# Patient Record
Sex: MALE | URBAN - METROPOLITAN AREA
[De-identification: names, ages, dates, MRNs, and addresses within clinical notes are randomized per-mention and may not be internally consistent; named-entity substitution may affect disease eponyms.]

---

## 2018-01-03 ENCOUNTER — OFFICE VISIT (OUTPATIENT)
Dept: INTERNAL MEDICINE | Facility: CLINIC | Age: 53
End: 2018-01-03
Payer: COMMERCIAL

## 2018-01-03 VITALS
HEART RATE: 63 BPM | HEIGHT: 72 IN | DIASTOLIC BLOOD PRESSURE: 66 MMHG | SYSTOLIC BLOOD PRESSURE: 110 MMHG | BODY MASS INDEX: 31.86 KG/M2 | WEIGHT: 235.2 LBS | OXYGEN SATURATION: 100 % | TEMPERATURE: 97.6 F

## 2018-01-03 DIAGNOSIS — M54.50 RIGHT-SIDED LOW BACK PAIN WITHOUT SCIATICA, UNSPECIFIED CHRONICITY: Primary | ICD-10-CM

## 2018-01-03 PROCEDURE — 99203 OFFICE O/P NEW LOW 30 MIN: CPT | Performed by: INTERNAL MEDICINE

## 2018-01-03 NOTE — PROGRESS NOTES
SUBJECTIVE:                                                      HPI: Imtiaz Cope is a pleasant 52 year old male who presents for referral to physical therapy:    - right lower back pain/superior buttock; no radiation down leg  - ongoing for weeks  - no obvious precipitating trauma, injury, unusual exertion    - pain when sitting, extending right leg in front of him, and tucking chin in  - also difficult to find comfortable position at night while sleeping    - no significant pain with activity or changing positions  - no significant pain with crossing right leg over left  - no significant pain with direct palpation of area involved    - no lower extremity numbness, tingling, or weakness  - no urinary retention or incontinence  - no fecal incontinence    PMH significant for testicular cancer status post orchiectomy and prophylactic radiation for left-sided seminoma in 2009. PRESTON since.    PMH also significant for obesity (BMI ~32).     Of note, patient exercises regularly without difficulty or pain/discomfort.    The medication, allergy, and problem lists have been reviewed and updated as appropriate.       OBJECTIVE:                                                      /66 (BP Location: Left arm, Patient Position: Chair, Cuff Size: Adult Large)  Pulse 63  Temp 97.6  F (36.4  C) (Oral)  Ht 6' (1.829 m)  Wt 235 lb 3.2 oz (106.7 kg)  SpO2 100%  BMI 31.9 kg/m2  Constitutional: well-appearing  Lumbar spine: no deformity, crepitus, or step-off; no spinal or paraspinal tenderness to palpation  Musculoskeletal: normal gait, station, and transfers; no piriformis or gluteal tenderness to palpation      ASSESSMENT/PLAN:                                                      (M54.5) Right-sided low back pain without sciatica, unspecified chronicity  (primary encounter diagnosis)  Comment: etiology unclear, likely musculoskeletal.  Plan:    - recommend trial of physical therapy.   - if no improvement with  physical therapy, patient to contact MD for further evaluation...    - especially in light of testicular cancer history.    The instructions on the AVS were discussed and explained to the patient. Patient expressed understanding of instructions.    (Chart documentation was completed, in part, with [employer] voice-recognition software. Even though reviewed, some grammatical, spelling, and word errors may remain.)    Staci Nelson MD   [address] [address]  T: [phone], F: [phone]

## 2018-01-03 NOTE — MR AVS SNAPSHOT
After Visit Summary   1/3/2018    Imtiaz Cope    MRN: [id number]           Patient Information     Date Of Birth          1965        Visit Information        Provider Department      1/3/2018 9:30 AM Staci Nelson MD [facility]        Today's Diagnoses     Right-sided low back pain without sciatica, unspecified chronicity    -  1      Care Instructions    Referral placed!          Follow-ups after your visit        Additional Services     [facility] PT, HAND, AND CHIROPRACTIC REFERRAL       **This order will print in the [facility] Scheduling Office**    Physical Therapy, Hand Therapy and Chiropractic Care are available through:    *[facility]  *[facility]  *[facility]    Call one number to schedule at any of the above locations: [phone].    Your provider has referred you to: Physical Therapy at [facility] or [facility]    Indication/Reason for Referral: Low Back Pain  Onset of Illness: weeks  Therapy Orders: Evaluate and Treat  Special Programs: None  Special Request: None    Tika Villela      Additional Comments for the Therapist or Chiropractor: already has appointment in [place] - 1/5/17 - Paul Breyen     Please be aware that coverage of these services is subject to the terms and limitations of your health insurance plan.  Call member services at your health plan with any benefit or coverage questions.      Please bring the following to your appointment:    *Your personal calendar for scheduling future appointments  *Comfortable clothing                  Who to contact     If you have questions or need follow up information about today's clinic visit or your schedule please contact [facility] directly at [phone].  Normal or non-critical lab and imaging results will be communicated to you by MyChart, letter or phone within 4 business days after the clinic has received the results. If you do  "not hear from us within 7 days, please contact the clinic through [employer] or phone. If you have a critical or abnormal lab result, we will notify you by phone as soon as possible.  Submit refill requests through [employer] or call your pharmacy and they will forward the refill request to us. Please allow 3 business days for your refill to be completed.          Additional Information About Your Visit        [employer] Information     [employer] lets you send messages to your doctor, view your test results, renew your prescriptions, schedule appointments and more. To sign up, go to [email or web address] . Click on \"Log in\" on the left side of the screen, which will take you to the Welcome page. Then click on \"Sign up Now\" on the right side of the page.     You will be asked to enter the access code listed below, as well as some personal information. Please follow the directions to create your username and password.     Your access code is: [email or web address]  Expires: 3/29/2018  3:38 PM     Your access code will  in 90 days. If you need help or a new code, please call your [facility] clinic or [phone].        Care EveryWhere ID     This is your Care EveryWhere ID. This could be used by other organizations to access your [facility] medical records  [id number]        Your Vitals Were     Pulse Temperature Height Pulse Oximetry BMI (Body Mass Index)       63 97.6  F (36.4  C) (Oral) 6' (1.829 m) 100% 31.9 kg/m2        Blood Pressure from Last 3 Encounters:   18 110/66    Weight from Last 3 Encounters:   18 235 lb 3.2 oz (106.7 kg)              We Performed the Following     [facility] PT, HAND, AND CHIROPRACTIC REFERRAL        Primary Care Provider Fax #    Physician No Ref-Primary [phone]       No address on file        Equal Access to Services     CRISTINA GRIER : Camacho Justice, waantonio bland, qaybta kaalmajose carpenter, andrez rubin. So [facility] [phone].    ATENCIÓN: " Si habla brandy, tiene a short disposición servicios gratuitos de asistencia lingüística. Jarod marc [phone].    We comply with applicable federal civil rights laws and [place] laws. We do not discriminate on the basis of race, color, national origin, age, disability, sex, sexual orientation, or gender identity.            Thank you!     Thank you for choosing [facility]  for your care. Our goal is always to provide you with excellent care. Hearing back from our patients is one way we can continue to improve our services. Please take a few minutes to complete the written survey that you may receive in the mail after your visit with us. Thank you!             Your Updated Medication List - Protect others around you: Learn how to safely use, store and throw away your medicines at www.disposemymeds.org.      Notice  As of 1/3/2018  9:47 AM    You have not been prescribed any medications.

## 2018-01-03 NOTE — PROGRESS NOTES
Low back pain  Right top buttock  Weeks  Better with stretching   No radiation down leg  Sharp pain  knife

## 2018-01-05 ENCOUNTER — THERAPY VISIT (OUTPATIENT)
Dept: PHYSICAL THERAPY | Facility: CLINIC | Age: 53
End: 2018-01-05
Payer: COMMERCIAL

## 2018-01-05 DIAGNOSIS — M54.41 ACUTE RIGHT-SIDED LOW BACK PAIN WITH RIGHT-SIDED SCIATICA: Primary | ICD-10-CM

## 2018-01-05 PROCEDURE — 97110 THERAPEUTIC EXERCISES: CPT | Mod: GP | Performed by: PHYSICAL THERAPIST

## 2018-01-05 PROCEDURE — 97161 PT EVAL LOW COMPLEX 20 MIN: CPT | Mod: GP | Performed by: PHYSICAL THERAPIST

## 2018-01-05 NOTE — PROGRESS NOTES
Subjective:  Patient is a 52 year old male presenting with rehab back hpi. The history is provided by the patient. No  was used.   Imtiaz Cope is a 52 year old male with a lumbar condition.  Condition occurred with:  Insidious onset.  Condition occurred: for unknown reasons.  This is a new condition  Onset of right lumbar pain radiating into the right buttock of unknown etiology. Pt referred by MD for physical therapy on 1-3-18.    Patient reports pain:  Lumbar spine right.  Radiates to:  Gluteals right, knee right and thigh right.  Pain is described as sharp and stabbing and is intermittent and reported as 8/10.  Associated symptoms:  Loss of strength. Pain is worse in the A.M. and worse in the P.M..  Symptoms are exacerbated by bending and sitting and relieved by NSAID's (stretch).  Since onset symptoms are unchanged.  Special testing: none.  Previous treatment includes physical therapy and surgery (lumbar discectomy greater than 6 years ago).    General health as reported by patient is good.  Pertinent medical history includes:  Cancer and heart problems.  Medical allergies: no.  Other surgeries include:  Orthopedic surgery and cancer surgery (lumbar discectomy, testicular cancer).  Current medications:  None as reported by the patient.  Current occupation .  Patient is working in normal job without restrictions.  Primary job tasks include:  Prolonged sitting.        Red flags:  None as reported by the patient.                        Objective:  Standing Alignment:        Lumbar deviations alignment: increased lordosis.                Flexibility/Screens:       Lower Extremity:  Decreased left lower extremity flexibility:Hamstrings    Decreased right lower extremity flexibility:  Piriformis and Hamstrings               Lumbar/SI Evaluation  ROM:  Arom wnl lumbar: positive slump test sitting.  AROM Lumbar:   Flexion:          75% pulls  Ext:                    80%   Side Bend:         Left:  90%    Right:  90%  Rotation:           Left:     Right:   Side Glide:        Left:     Right:         Strength: weak lower abdominals  Lumbar Myotomes:  normal            Lumbar DTR's:  normal        Lumbar Dermtomes:  Lumbar dermatomes: decreased sensation right S1 dermatome from previous lumbar surgery.                  Lumbar Palpation:  Palpation (lumbar): tender to palpation right piriformis.                                                         General     ROS    Assessment/Plan:    Patient is a 52 year old male with lumbar complaints.    Patient has the following significant findings with corresponding treatment plan.                Diagnosis 1:  Lumbar painPain -  hot/cold therapy, manual therapy, self management, education and home program  Decreased ROM/flexibility - manual therapy, therapeutic exercise, therapeutic activity and home program  Decreased strength - therapeutic exercise, therapeutic activities and home program    Therapy Evaluation Codes:   1) History comprised of:   Personal factors that impact the plan of care:      Past/current experiences.    Comorbidity factors that impact the plan of care are:      Cancer, Heart problems and Numbness/tingling.     Medications impacting care: None.  2) Examination of Body Systems comprised of:   Body structures and functions that impact the plan of care:      Lumbar spine.   Activity limitations that impact the plan of care are:      Bathing, Bending, Dressing, Sitting, Standing and Walking.  3) Clinical presentation characteristics are:   Stable/Uncomplicated.  4) Decision-Making    Low complexity using standardized patient assessment instrument and/or measureable assessment of functional outcome.  Cumulative Therapy Evaluation is: Low complexity.    Previous and current functional limitations:  (See Goal Flow Sheet for this information)    Short term and Long term goals: (See Goal Flow Sheet for this information)     Communication ability:   Patient appears to be able to clearly communicate and understand verbal and written communication and follow directions correctly.  Treatment Explanation - The following has been discussed with the patient:   RX ordered/plan of care  Anticipated outcomes  Possible risks and side effects  This patient would benefit from PT intervention to resume normal activities.   Rehab potential is good.    Frequency:  1 X week, once daily  Duration:  for 4 weeks  Discharge Plan:  Achieve all LTG.  Independent in home treatment program.  Reach maximal therapeutic benefit.    Please refer to the daily flowsheet for treatment today, total treatment time and time spent performing 1:1 timed codes.

## 2018-01-06 PROBLEM — M54.41 ACUTE RIGHT-SIDED LOW BACK PAIN WITH RIGHT-SIDED SCIATICA: Status: ACTIVE | Noted: 2018-01-06

## 2018-01-13 ENCOUNTER — THERAPY VISIT (OUTPATIENT)
Dept: PHYSICAL THERAPY | Facility: CLINIC | Age: 53
End: 2018-01-13
Payer: COMMERCIAL

## 2018-01-13 DIAGNOSIS — M54.41 ACUTE RIGHT-SIDED LOW BACK PAIN WITH RIGHT-SIDED SCIATICA: ICD-10-CM

## 2018-01-13 PROCEDURE — 97110 THERAPEUTIC EXERCISES: CPT | Mod: GP | Performed by: PHYSICAL THERAPIST

## 2018-01-13 PROCEDURE — 97530 THERAPEUTIC ACTIVITIES: CPT | Mod: GP | Performed by: PHYSICAL THERAPIST

## 2018-01-13 NOTE — PROGRESS NOTES
Subjective:  HPI                    Objective:  System    Physical Exam    General     ROS    Assessment/Plan:    SUBJECTIVE  Subjective changes as noted by pt:  Pt reports having a cough for the past week which aggravates the symptoms.      Current pain level: 2/10     Changes in function:  Pt reports hard to tell if there is a difference secondary to symptoms aggravated by coughing.      Adverse reaction to treatment or activity:  None    OBJECTIVE  Changes in objective findings:  Tissue tightness noted over left glut matt. Trial of STM with tools. Progressed to strengthening exercises to right buttock. Improved hamstring flexibility      ASSESSMENT  Imtiaz continues to require intervention to meet STG and LTG's: PT  Patient is progressing as expected.  Response to therapy has shown an improvement in  flexibility  Progress made towards STG/LTG?  Yes,     PLAN  Current treatment program is being advanced to more complex exercises.    PTA/ATC plan:  N/A    Please refer to the daily flowsheet for treatment today, total treatment time and time spent performing 1:1 timed codes.

## 2018-01-19 ENCOUNTER — THERAPY VISIT (OUTPATIENT)
Dept: PHYSICAL THERAPY | Facility: CLINIC | Age: 53
End: 2018-01-19
Payer: COMMERCIAL

## 2018-01-19 DIAGNOSIS — M54.41 ACUTE RIGHT-SIDED LOW BACK PAIN WITH RIGHT-SIDED SCIATICA: ICD-10-CM

## 2018-01-19 PROCEDURE — 97140 MANUAL THERAPY 1/> REGIONS: CPT | Mod: GP | Performed by: PHYSICAL THERAPIST

## 2018-01-19 PROCEDURE — 97110 THERAPEUTIC EXERCISES: CPT | Mod: GP | Performed by: PHYSICAL THERAPIST

## 2018-01-19 NOTE — PROGRESS NOTES
Subjective:  HPI                    Objective:  System    Physical Exam    General     ROS    Assessment/Plan:    SUBJECTIVE  Subjective changes as noted by pt:  Pt reports little change in condition     Current pain level: 2/10     Changes in function:  None     Adverse reaction to treatment or activity:  None    OBJECTIVE  Changes in objective findings:  Positive slump test remains. Pt noted decreased pain after performing MIRZA exercises and prone press up.         ASSESSMENT  Imtiaz continues to require intervention to meet STG and LTG's: PT  Patient is progressing as expected.  Response to therapy has shown lack of progress in  pain level  Progress made towards STG/LTG?  Pt progressing slowly towards goals.     PLAN  Current treatment program is being advanced to more complex exercises.    PTA/ATC plan:  N/A    Please refer to the daily flowsheet for treatment today, total treatment time and time spent performing 1:1 timed codes.

## 2018-02-02 ENCOUNTER — THERAPY VISIT (OUTPATIENT)
Dept: PHYSICAL THERAPY | Facility: CLINIC | Age: 53
End: 2018-02-02
Payer: COMMERCIAL

## 2018-02-02 DIAGNOSIS — M54.41 ACUTE RIGHT-SIDED LOW BACK PAIN WITH RIGHT-SIDED SCIATICA: ICD-10-CM

## 2018-02-02 PROCEDURE — 97110 THERAPEUTIC EXERCISES: CPT | Mod: GP | Performed by: PHYSICAL THERAPIST

## 2018-02-02 PROCEDURE — 97035 APP MDLTY 1+ULTRASOUND EA 15: CPT | Mod: GP | Performed by: PHYSICAL THERAPIST

## 2018-02-02 PROCEDURE — 97140 MANUAL THERAPY 1/> REGIONS: CPT | Mod: GP | Performed by: PHYSICAL THERAPIST

## 2018-02-23 ENCOUNTER — THERAPY VISIT (OUTPATIENT)
Dept: PHYSICAL THERAPY | Facility: CLINIC | Age: 53
End: 2018-02-23
Payer: COMMERCIAL

## 2018-02-23 DIAGNOSIS — M54.41 ACUTE RIGHT-SIDED LOW BACK PAIN WITH RIGHT-SIDED SCIATICA: ICD-10-CM

## 2018-02-23 PROCEDURE — 97140 MANUAL THERAPY 1/> REGIONS: CPT | Mod: GP | Performed by: PHYSICAL THERAPIST

## 2018-02-23 PROCEDURE — 97035 APP MDLTY 1+ULTRASOUND EA 15: CPT | Mod: GP | Performed by: PHYSICAL THERAPIST

## 2018-02-23 PROCEDURE — 97530 THERAPEUTIC ACTIVITIES: CPT | Mod: GP | Performed by: PHYSICAL THERAPIST

## 2018-02-24 NOTE — PROGRESS NOTES
Subjective:  HPI                    Objective:  System    Physical Exam    General     ROS    Assessment/Plan:    PROGRESS  REPORT    Progress reporting period is from 1-5-18 to 2-23-18.       SUBJECTIVE  Subjective changes noted by patient:  Pt notes decreased pain but increased tingling in the right thigh. .       Current pain level is 2/10  .     Previous pain level was  8/10  .   Changes in function:  Pt continues to note paresthesia in right posterior thigh with sitting.   Adverse reaction to treatment or activity: None    OBJECTIVE  Changes noted in objective findings:  Lumbar flexion 80% extension 60% right lateral flexion 60% increased right thigh paresthesia left lateral flexion 80%. Pt remains tender to palpation right piriformis. Right piriformis flexibility improving but remains tighter than the left. Positive SLR at 30 degrees flexion but symptoms decrease as leg is raised higher. Positive slump test remains        ASSESSMENT/PLAN  Updated problem list and treatment plan: Diagnosis 1:  Right lumbar pain  Pain -  hot/cold therapy, US, manual therapy, self management, education, directional preference exercise and home program  Decreased ROM/flexibility - manual therapy, therapeutic exercise, therapeutic activity and home program  Decreased strength - therapeutic exercise, therapeutic activities and home program  STG/LTGs have been met or progress has been made towards goals:  Pt has made limited progress towards goals  Assessment of Progress: The patient's condition has potential to improve.  Self Management Plans:  Patient has been instructed in a home treatment program.  I have re-evaluated this patient and find that the nature, scope, duration and intensity of the therapy is appropriate for the medical condition of the patient.  Imtiaz continues to require the following intervention to meet STG and LTG's:  PT    Recommendations:  Pt will see back specialist a [facility] and return for follow up  therapy with Lay Kirby PT    Please refer to the daily flowsheet for treatment today, total treatment time and time spent performing 1:1 timed codes.

## 2018-03-16 PROBLEM — M54.41 ACUTE RIGHT-SIDED LOW BACK PAIN WITH RIGHT-SIDED SCIATICA: Status: RESOLVED | Noted: 2018-01-06 | Resolved: 2018-03-16

## 2019-02-11 ENCOUNTER — THERAPY VISIT (OUTPATIENT)
Dept: PHYSICAL THERAPY | Facility: CLINIC | Age: 54
End: 2019-02-11
Payer: COMMERCIAL

## 2019-02-11 DIAGNOSIS — M54.50 LUMBAR PAIN WITH RADIATION DOWN RIGHT LEG: ICD-10-CM

## 2019-02-11 DIAGNOSIS — M79.604 LUMBAR PAIN WITH RADIATION DOWN RIGHT LEG: ICD-10-CM

## 2019-02-11 PROCEDURE — 97161 PT EVAL LOW COMPLEX 20 MIN: CPT | Mod: GP | Performed by: PHYSICAL THERAPIST

## 2019-02-11 PROCEDURE — 97110 THERAPEUTIC EXERCISES: CPT | Mod: GP | Performed by: PHYSICAL THERAPIST

## 2019-02-11 NOTE — PROGRESS NOTES
[facility] Initial Evaluation  Subjective:  Onset of lumbar pain radiating into right lower extremity 8 weeks ago of unknown etiology. Pt referred by MD for physical therapy on 2-4-19      The history is provided by the patient. No  was used.   Imtiaz Cope is a 53 year old male with a lumbar condition.  Condition occurred with:  Insidious onset.  Condition occurred: other.  This is a recurrent condition     Patient reports pain:  Lumbar spine right and lumbar spine left.  Radiates to:  Foot right, gluteals right, knee right, lower leg right and thigh right.  Pain is described as sharp (dull) and is constant and reported as 4/10.  Associated symptoms:  Loss of strength, tingling and numbness. Pain is worse during the night.  Symptoms are exacerbated by sitting, bending, twisting, standing and walking and relieved by NSAID's and activity/movement.  Since onset symptoms are unchanged.  Special testing: none in the past year.  Previous treatment includes physical therapy (2-18 pt had been doing well with HEP until 2 months ago).  There was significant improvement following previous treatment.  General health as reported by patient is good.  Pertinent medical history includes:  None.  Medical allergies: no.  Other surgeries include:  Cancer surgery (testicular).  Current medications:  Anti-inflammatory.    Employment status: manager.  Primary job tasks include:  Prolonged sitting.    Barriers include:  None as reported by the patient.    Red flags:  None as reported by the patient.                        Objective:  Standing Alignment:        Lumbar deviations alignment: anterior rotation of right ilium.                Flexibility/Screens:       Lower Extremity:      Decreased right lower extremity flexibility:  Piriformis; Hip Flexors and Hamstrings               Lumbar/SI Evaluation  ROM:    AROM Lumbar:   Flexion:          Mod loss, increased flexibility with increased  reps  Ext:                    Mod loss increases right buttock pain   Side Bend:        Left:  Min loss    Right:  Min loss increased right lumbar pain  Rotation:           Left:     Right:   Side Glide:        Left:     Right:         Strength: weak lower abdominals   Lumbar Myotomes:  normal            Lumbar DTR's:  normal        Lumbar Dermtomes:                S1 Right:  Hypo-light touch  Neural Tension/Mobility:      Right side:   SLR w/DF; Slump and SLR positive.  Lumbar Palpation:  Palpation (lumbar): right sacral sulci.    Tenderness present at Right: Piriformis        SI joint/Sacrum:    Anterior rotation right ilium. Positive Zoila's right                                                        General     ROS    Assessment/Plan:    Patient is a 53 year old male with lumbar and sacral complaints.    Patient has the following significant findings with corresponding treatment plan.                Diagnosis 1:  Right lumbosacral pain radiating into the right lower extremity  Pain -  hot/cold therapy, manual therapy, self management, education and home program  Decreased ROM/flexibility - manual therapy, therapeutic exercise, therapeutic activity and home program  Decreased strength - therapeutic exercise, therapeutic activities and home program      Therapy Evaluation Codes:   1) History comprised of:   Personal factors that impact the plan of care:      Time since onset of symptoms.    Comorbidity factors that impact the plan of care are:      Weakness.     Medications impacting care: Anti-inflammatory.  2) Examination of Body Systems comprised of:   Body structures and functions that impact the plan of care:      Lumbar spine and Sacral illiac joint.   Activity limitations that impact the plan of care are:      Bathing, Bending, Dressing, Lifting, Sitting, Standing and Walking.  3) Clinical presentation characteristics are:   Stable/Uncomplicated.  4) Decision-Making    Low complexity using standardized  patient assessment instrument and/or measureable assessment of functional outcome.  Cumulative Therapy Evaluation is: Low complexity.    Previous and current functional limitations:  (See Goal Flow Sheet for this information)    Short term and Long term goals: (See Goal Flow Sheet for this information)     Communication ability:  Patient appears to be able to clearly communicate and understand verbal and written communication and follow directions correctly.  Treatment Explanation - The following has been discussed with the patient:   RX ordered/plan of care  Anticipated outcomes  Possible risks and side effects  This patient would benefit from PT intervention to resume normal activities.   Rehab potential is good.    Frequency:  1 X week, once daily  Duration:  for 6 weeks  Discharge Plan:  Achieve all LTG.  Independent in home treatment program.  Reach maximal therapeutic benefit.    Please refer to the daily flowsheet for treatment today, total treatment time and time spent performing 1:1 timed codes.

## 2019-02-11 NOTE — LETTER
EDITH RIBEIRO BURNSREKHA PT  [address] [address]  [address]  [facility] [address]  [phone]    2019    Re: Imtiaz Cope   :   1965  MRN:  [id number]   REFERRING PHYSICIAN:   Ishan KELLER PT    Date of Initial Evaluation:  2019  Visits:  Rxs Used: 1  Reason for Referral:  Lumbar pain with radiation down right leg    [facility] Initial Evaluation  Subjective:  Onset of lumbar pain radiating into right lower extremity 8 weeks ago of unknown etiology. Pt referred by MD for physical therapy on 19    The history is provided by the patient. No  was used.   Imtiaz Cope is a 53 year old male with a lumbar condition.  Condition occurred with:  Insidious onset.  Condition occurred: other.  This is a recurrent condition     Patient reports pain:  Lumbar spine right and lumbar spine left.  Radiates to:  Foot right, gluteals right, knee right, lower leg right and thigh right.  Pain is described as sharp (dull) and is constant and reported as 4/10.  Associated symptoms:  Loss of strength, tingling and numbness. Pain is worse during the night.  Symptoms are exacerbated by sitting, bending, twisting, standing and walking and relieved by NSAID's and activity/movement.  Since onset symptoms are unchanged.  Special testing: none in the past year.  Previous treatment includes physical therapy (-18 pt had been doing well with HEP until 2 months ago).  There was significant improvement following previous treatment.  General health as reported by patient is good.  Pertinent medical history includes:  None.  Medical allergies: no.  Other surgeries include:  Cancer surgery (testicular).  Current medications:  Anti-inflammatory.    Employment status: manager.  Primary job tasks include:  Prolonged sitting.  Barriers include:  None as reported by the patient.  Red flags:  None as reported by the patient.  Objective:  Standing Alignment:     Lumbar deviations alignment: anterior rotation of right ilium.  Flexibility/Screens:   Lower Extremity:  Decreased right lower extremity flexibility:  Piriformis; Hip Flexors and Hamstrings  Lumbar/SI Evaluation  ROM:    AROM Lumbar:   Flexion:          Mod loss, increased flexibility with increased reps  Ext:                    Mod loss increases right buttock pain   Side Bend:        Left:  Min loss    Right:  Min loss increased right lumbar pain  Rotation:           Left:     Right:   Side Glide:        Left:     Right:    Strength: weak lower abdominals   Lumbar Myotomes:  normal  Lumbar DTR's:  normal      Re: Imtiaz Cope   :   1965  Lumbar Dermtomes:    S1 Right:  Hypo-light touch  Neural Tension/Mobility:    Right side:   SLR w/DF; Slump and SLR positive.  Lumbar Palpation:  Palpation (lumbar): right sacral sulci.  Tenderness present at Right: Piriformis  SI joint/Sacrum:    Anterior rotation right ilium. Positive Zoila's right   Assessment/Plan:    Patient is a 53 year old male with lumbar and sacral complaints.    Patient has the following significant findings with corresponding treatment plan.                Diagnosis 1:  Right lumbosacral pain radiating into the right lower extremity  Pain -  hot/cold therapy, manual therapy, self management, education and home program  Decreased ROM/flexibility - manual therapy, therapeutic exercise, therapeutic activity and home program  Decreased strength - therapeutic exercise, therapeutic activities and home program  Therapy Evaluation Codes:   1) History comprised of:   Personal factors that impact the plan of care:      Time since onset of symptoms.    Comorbidity factors that impact the plan of care are:      Weakness.     Medications impacting care: Anti-inflammatory.  2) Examination of Body Systems comprised of:   Body structures and functions that impact the plan of care:      Lumbar spine and Sacral illiac joint.   Activity limitations that  impact the plan of care are:      Bathing, Bending, Dressing, Lifting, Sitting, Standing and Walking.  3) Clinical presentation characteristics are:   Stable/Uncomplicated.  4) Decision-Making    Low complexity using standardized patient assessment instrument and/or measureable assessment of functional outcome.  Cumulative Therapy Evaluation is: Low complexity.  Previous and current functional limitations:  (See Goal Flow Sheet for this information)    Short term and Long term goals: (See Goal Flow Sheet for this information)   Communication ability:  Patient appears to be able to clearly communicate and understand verbal and written communication and follow directions correctly.  Treatment Explanation - The following has been discussed with the patient:   RX ordered/plan of care  Anticipated outcomes  Possible risks and side effects  This patient would benefit from PT intervention to resume normal activities.   Rehab potential is good.  Frequency:  1 X week, once daily  Duration:  for 6 weeks  Discharge Plan:  Achieve all LTG.  Independent in home treatment program.  Reach maximal therapeutic benefit.  Thank you for your referral.  INQUIRIES  Therapist: RAUL Pandey [facility] PT  [address] [address] [address]  Phone: [phone] / Fax: [phone]

## 2019-02-22 ENCOUNTER — THERAPY VISIT (OUTPATIENT)
Dept: PHYSICAL THERAPY | Facility: CLINIC | Age: 54
End: 2019-02-22
Payer: COMMERCIAL

## 2019-02-22 DIAGNOSIS — M79.604 LUMBAR PAIN WITH RADIATION DOWN RIGHT LEG: ICD-10-CM

## 2019-02-22 DIAGNOSIS — M54.50 LUMBAR PAIN WITH RADIATION DOWN RIGHT LEG: ICD-10-CM

## 2019-02-22 PROCEDURE — 97110 THERAPEUTIC EXERCISES: CPT | Mod: GP | Performed by: PHYSICAL THERAPIST

## 2019-02-22 PROCEDURE — 97530 THERAPEUTIC ACTIVITIES: CPT | Mod: GP | Performed by: PHYSICAL THERAPIST

## 2019-03-01 ENCOUNTER — THERAPY VISIT (OUTPATIENT)
Dept: PHYSICAL THERAPY | Facility: CLINIC | Age: 54
End: 2019-03-01
Payer: COMMERCIAL

## 2019-03-01 DIAGNOSIS — M54.50 LUMBAR PAIN WITH RADIATION DOWN RIGHT LEG: ICD-10-CM

## 2019-03-01 DIAGNOSIS — M79.604 LUMBAR PAIN WITH RADIATION DOWN RIGHT LEG: ICD-10-CM

## 2019-03-01 PROCEDURE — 97035 APP MDLTY 1+ULTRASOUND EA 15: CPT | Mod: GP | Performed by: PHYSICAL THERAPIST

## 2019-03-01 PROCEDURE — 97140 MANUAL THERAPY 1/> REGIONS: CPT | Mod: GP | Performed by: PHYSICAL THERAPIST

## 2019-03-01 PROCEDURE — 97110 THERAPEUTIC EXERCISES: CPT | Mod: GP | Performed by: PHYSICAL THERAPIST

## 2019-03-02 NOTE — PROGRESS NOTES
Subjective:  HPI                    Objective:  System    Physical Exam    General     ROS    Assessment/Plan:    SUBJECTIVE  Subjective changes as noted by pt:  Pt continues to note right lumbar pain      Current pain level: 4/10     Changes in function:  None     Adverse reaction to treatment or activity:  None    OBJECTIVE  Changes in objective findings:  Point tenderness remains right piriformis. US and trigger point release trial to decrease pain.         ASSESSMENT  Imtiaz continues to require intervention to meet STG and LTG's: PT  Patient is progressing as expected.  Response to therapy has shown lack of progress in  pain level  Progress made towards STG/LTG?  None    PLAN  Current treatment program is being advanced to more complex exercises.    PTA/ATC plan:  N/A    Please refer to the daily flowsheet for treatment today, total treatment time and time spent performing 1:1 timed codes.

## 2019-03-08 ENCOUNTER — THERAPY VISIT (OUTPATIENT)
Dept: PHYSICAL THERAPY | Facility: CLINIC | Age: 54
End: 2019-03-08
Payer: COMMERCIAL

## 2019-03-08 DIAGNOSIS — M79.604 LUMBAR PAIN WITH RADIATION DOWN RIGHT LEG: ICD-10-CM

## 2019-03-08 DIAGNOSIS — M54.50 LUMBAR PAIN WITH RADIATION DOWN RIGHT LEG: ICD-10-CM

## 2019-03-08 PROCEDURE — 97140 MANUAL THERAPY 1/> REGIONS: CPT | Mod: GP | Performed by: PHYSICAL THERAPIST

## 2019-03-08 PROCEDURE — 97110 THERAPEUTIC EXERCISES: CPT | Mod: GP | Performed by: PHYSICAL THERAPIST

## 2019-03-08 PROCEDURE — 97035 APP MDLTY 1+ULTRASOUND EA 15: CPT | Mod: GP | Performed by: PHYSICAL THERAPIST

## 2019-03-16 ENCOUNTER — THERAPY VISIT (OUTPATIENT)
Dept: PHYSICAL THERAPY | Facility: CLINIC | Age: 54
End: 2019-03-16
Payer: COMMERCIAL

## 2019-03-16 DIAGNOSIS — M54.50 LUMBAR PAIN WITH RADIATION DOWN RIGHT LEG: ICD-10-CM

## 2019-03-16 DIAGNOSIS — M79.604 LUMBAR PAIN WITH RADIATION DOWN RIGHT LEG: ICD-10-CM

## 2019-03-16 PROCEDURE — 97110 THERAPEUTIC EXERCISES: CPT | Mod: GP | Performed by: PHYSICAL THERAPIST

## 2019-03-16 PROCEDURE — 97035 APP MDLTY 1+ULTRASOUND EA 15: CPT | Mod: GP | Performed by: PHYSICAL THERAPIST

## 2019-03-16 PROCEDURE — 97140 MANUAL THERAPY 1/> REGIONS: CPT | Mod: GP | Performed by: PHYSICAL THERAPIST

## 2019-03-16 NOTE — PROGRESS NOTES
Subjective:  HPI                    Objective:  System    Physical Exam    General     ROS    Assessment/Plan:    SUBJECTIVE  Subjective changes as noted by pt:  Pt reports decreased paresthesia in the right lower extremity when performing the stretches. Pt notes left lumbar pain today secondary to prolonged sitting yesterday.     Current pain level: 3/10     Changes in function:  Pt still notes difficulty with sleeping. Pt reports episodes of pain are becoming less frequent.      Adverse reaction to treatment or activity:  None    OBJECTIVE  Changes in objective findings: tightness noted right posterior hip capsule.  Trial of right posterior hip capsule stretch with therapist today. Pt demonstrates improved hamstring flexibility.         ASSESSMENT  Imtiaz continues to require intervention to meet STG and LTG's: PT  Patient's symptoms are resolving.  Response to therapy has shown an improvement in  Frequency of pain  Progress made towards STG/LTG?  Yes,     PLAN  Current treatment program is being advanced to more complex exercises.    PTA/ATC plan:  N/A    Please refer to the daily flowsheet for treatment today, total treatment time and time spent performing 1:1 timed codes.

## 2019-03-22 ENCOUNTER — THERAPY VISIT (OUTPATIENT)
Dept: PHYSICAL THERAPY | Facility: CLINIC | Age: 54
End: 2019-03-22
Payer: COMMERCIAL

## 2019-03-22 DIAGNOSIS — M54.50 LUMBAR PAIN WITH RADIATION DOWN RIGHT LEG: ICD-10-CM

## 2019-03-22 DIAGNOSIS — M79.604 LUMBAR PAIN WITH RADIATION DOWN RIGHT LEG: ICD-10-CM

## 2019-03-22 PROCEDURE — 97140 MANUAL THERAPY 1/> REGIONS: CPT | Mod: GP | Performed by: PHYSICAL THERAPIST

## 2019-03-22 PROCEDURE — 97110 THERAPEUTIC EXERCISES: CPT | Mod: GP | Performed by: PHYSICAL THERAPIST

## 2019-03-22 PROCEDURE — 97035 APP MDLTY 1+ULTRASOUND EA 15: CPT | Mod: GP | Performed by: PHYSICAL THERAPIST

## 2019-03-22 NOTE — PROGRESS NOTES
Subjective:  HPI                    Objective:  System         Lumbar/SI Evaluation  ROM:    AROM Lumbar:   Flexion:            Min loss  Ext:                    Min loss   Side Bend:        Left:  Min loss    Right:  Min loss  Rotation:           Left:  Min loss    Right:  Min loss  Side Glide:        Left:     Right:                                                                          General     ROS    Assessment/Plan:    DISCHARGE REPORT    Progress reporting period is from 2-11-19 to 3-22-19.       SUBJECTIVE  Subjective changes noted by patient:  Pt reports noticeable improvement in symptoms over the past week. Pt notes decreased pain and increased mobility.       Current pain level is 1/10  .     Previous pain level was  4/10  .   Changes in function:  Pt notes increased ease with ambulation, standing and transfers  Adverse reaction to treatment or activity: None    OBJECTIVE  Changes noted in objective findings:  See above for lumbar AROM. Pt demonstrates improved core strength and lower extremity flexibility. Piriformis flexibility improved and is less tender to palpation         ASSESSMENT/PLAN  Updated problem list and treatment plan: Diagnosis 1:  Lumbosacral pain  Pain -  hot/cold therapy, US, manual therapy, self management, education and home program  Decreased ROM/flexibility - manual therapy, therapeutic exercise, therapeutic activity and home program  Decreased strength - therapeutic exercise, therapeutic activities and home program  STG/LTGs have been met or progress has been made towards goals:  Yes,   Assessment of Progress: The patient's condition is improving.  Self Management Plans:  Patient has been instructed in a home treatment program.  I have re-evaluated this patient and find that the nature, scope, duration and intensity of the therapy is appropriate for the medical condition of the patient.  mItiaz continues to require the following intervention to meet STG and LTG's:  PT  intervention is no longer required to meet STG/LTG.    Recommendations:  This patient is ready to be discharged from therapy and continue their home treatment program.    Please refer to the daily flowsheet for treatment today, total treatment time and time spent performing 1:1 timed codes.

## 2024-12-19 NOTE — PROGRESS NOTES
Subjective:  HPI                    Objective:  System    Physical Exam    General     ROS    Assessment/Plan:    SUBJECTIVE  Subjective changes as noted by pt:  Pt notes continued tightness in the right lumbar region. Area loosens up with exercises but tightens up by the next day.  Increased tingling in right lower extremity with MIRZA exercise  Current pain level: 4/10     Changes in function:  Pt continues to exercise and do all his ADL's but remains tight     Adverse reaction to treatment or activity:  None    OBJECTIVE  Changes in objective findings:  Lumbar extension increases symptoms in the right lower extremity. Pt notes tightness with lumbar flexion with foot on chair.         ASSESSMENT  Imtiaz continues to require intervention to meet STG and LTG's: PT  Patient's symptoms are resolving.  Response to therapy has shown an improvement in  flexibility  Progress made towards STG/LTG?  Yes,     PLAN  Current treatment program is being advanced to more complex exercises. Trial of US and STM with tools to right lumbar region to decrease adhesions    PTA/ATC plan:  N/A    Please refer to the daily flowsheet for treatment today, total treatment time and time spent performing 1:1 timed codes.           97.7